# Patient Record
Sex: FEMALE | Race: OTHER | URBAN - METROPOLITAN AREA
[De-identification: names, ages, dates, MRNs, and addresses within clinical notes are randomized per-mention and may not be internally consistent; named-entity substitution may affect disease eponyms.]

---

## 2019-03-20 NOTE — PATIENT DISCUSSION
Surgery Counseling: I have discussed the option of scheduling surgery versus following, as well as the risks, benefits and alternatives of cataract surgery with the patient. It was explained that the surgery is medically indicated at this time, and it can be performed at the patient's option as delaying will cause no further deterioration, therefore there is no rush and there is no harm in waiting to have surgery. It was also explained that there is no guarantee that removing the cataract will improve their vision. The patient understands and desires to proceed with cataract surgery with the implantation of an intraocular lens to improve vision for _____PAINTING___________. I have given the patient the prescribed regimen of the all-in-one drop to use before and after cataract surgery. They have elected to use the all-in-one option of Pred/Gati/Brom(prednisolone acetate,gatifloxacin,and bromfenac. Patient to administer as directed.

## 2019-03-20 NOTE — PATIENT DISCUSSION
CATARACTS, OU - VISUALLY SIGNIFICANT. SCHEDULE _OS_ FIRST THEN LATER IN OD__ DISCUSSED OPTION OF _STD OU__VS _MULTIFOCAL OU _. PATIENT UNDERSTANDS AND DESIRES __STD OU ___________________________.

## 2019-04-23 NOTE — PATIENT DISCUSSION
S/P PE IOL, _os_. DOING WELL. CONTINUE PRED-GATI-BROM IN THE SURGICAL EYE  FOR A TOTAL OF 3 WEEKS USE THEN DISCONTINUE. PATIENT DESIRES __STD_____IOL FOR 2ND EYE. SCHEDULE CATARACT SURGERY.

## 2022-10-14 ENCOUNTER — NEW PATIENT (OUTPATIENT)
Dept: URBAN - METROPOLITAN AREA CLINIC 2 | Facility: CLINIC | Age: 87
End: 2022-10-14

## 2022-10-14 DIAGNOSIS — H52.4: ICD-10-CM

## 2022-10-14 DIAGNOSIS — H16.223: ICD-10-CM

## 2022-10-14 DIAGNOSIS — H52.12: ICD-10-CM

## 2022-10-14 DIAGNOSIS — H35.371: ICD-10-CM

## 2022-10-14 PROCEDURE — 92004 COMPRE OPH EXAM NEW PT 1/>: CPT

## 2022-10-14 PROCEDURE — 92015 DETERMINE REFRACTIVE STATE: CPT

## 2022-10-14 ASSESSMENT — VISUAL ACUITY
OD_SC: 20/25
OD_SC: 20/20
OS_SC: 20/25
OU_SC: 20/25
OU_SC: 20/20
OS_SC: 20/100-1

## 2022-10-14 ASSESSMENT — TONOMETRY
OD_IOP_MMHG: 16
OS_IOP_MMHG: 16

## 2023-10-18 ENCOUNTER — COMPREHENSIVE EXAM (OUTPATIENT)
Dept: URBAN - METROPOLITAN AREA CLINIC 2 | Facility: CLINIC | Age: 88
End: 2023-10-18

## 2023-10-18 DIAGNOSIS — Z96.1: ICD-10-CM

## 2023-10-18 DIAGNOSIS — H35.373: ICD-10-CM

## 2023-10-18 DIAGNOSIS — H16.223: ICD-10-CM

## 2023-10-18 PROCEDURE — 92014 COMPRE OPH EXAM EST PT 1/>: CPT

## 2023-10-18 PROCEDURE — 92015 DETERMINE REFRACTIVE STATE: CPT

## 2023-10-18 ASSESSMENT — TONOMETRY
OS_IOP_MMHG: 13
OD_IOP_MMHG: 13

## 2023-10-18 ASSESSMENT — VISUAL ACUITY
OU_CC: J1
OD_SC: 20/25+1
OS_CC: J2
OD_CC: J2
OS_SC: 20/60-2
OU_SC: 20/25+1